# Patient Record
Sex: MALE | Race: BLACK OR AFRICAN AMERICAN | NOT HISPANIC OR LATINO | Employment: UNEMPLOYED | URBAN - METROPOLITAN AREA
[De-identification: names, ages, dates, MRNs, and addresses within clinical notes are randomized per-mention and may not be internally consistent; named-entity substitution may affect disease eponyms.]

---

## 2020-08-17 NOTE — PROGRESS NOTES
Clinic Note  2020      Subjective:       Patient ID:  Mary is a 29 y.o. male being seen for a new visit.      Chief Complaint: Establish Care, Back Pain (pt c/o back pain started out the blue about 4 months ago...pt stated nothing relieves the pain; hurt to walk due to back pain), and Numbness (bilat fingertips)    HPI   Mary is a 29 year old male in clinic to establish care with PCP. States that for the last 4 years he has worked as a . Approximately 4 months ago he began experiencing lower back pain with tingling, numbness radiating to bilateral thighs.  He reported to [facility], xrays and CT performed. He states he was told that he had bulging discs. States that he was also told he has bulging disc in neck, states tingling in bilateral hands.  Denies the use of heat, ice, stretching, PT, OTC topical or oral pain relievers.  He was prescribed Neurontin, needs refill. Needs routine labs. He is hypertensive in clinic today, related to pain.    The following portions of the patient's history were reviewed and updated as appropriate: allergies, current medications, past family history, past medical history, past social history, past surgical history and problem list.      Family History   Family history unknown: Yes     Social History     Socioeconomic History    Marital status: Single     Spouse name: Not on file    Number of children: 2    Years of education: Not on file    Highest education level: Not on file   Occupational History    Occupation: Unemployeed   Social Needs    Financial resource strain: Patient refused    Food insecurity     Worry: Patient refused     Inability: Patient refused    Transportation needs     Medical: Patient refused     Non-medical: Patient refused   Tobacco Use    Smoking status: Former Smoker     Packs/day: 1.00     Types: Cigarettes     Start date:      Quit date: 2020     Years since quittin.1    Smokeless tobacco: Never Used  "  Substance and Sexual Activity    Alcohol use: Not on file     Comment: Ocassional    Drug use: Never    Sexual activity: Not on file   Lifestyle    Physical activity     Days per week: Patient refused     Minutes per session: Patient refused    Stress: Patient refused   Relationships    Social connections     Talks on phone: Patient refused     Gets together: Patient refused     Attends [employer] service: Patient refused     Active member of club or organization: Patient refused     Attends meetings of clubs or organizations: Patient refused     Relationship status: Patient refused   Other Topics Concern    Not on file   Social History Narrative    Not on file     History reviewed. No pertinent surgical history.  Patient Active Problem List   Diagnosis    Bilateral low back pain with sciatica       Review of Systems   Constitutional: Negative for chills, fever, malaise/fatigue and weight loss.   HENT: Negative for congestion, hearing loss, sinus pain, sore throat and tinnitus.    Respiratory: Negative for cough, sputum production, shortness of breath and wheezing.    Cardiovascular: Negative for chest pain, palpitations, claudication and leg swelling.   Gastrointestinal: Negative for constipation, diarrhea, nausea and vomiting.   Genitourinary: Negative for dysuria, frequency, hematuria and urgency.   Musculoskeletal: Positive for back pain and neck pain.           Objective:      BP (!) 148/98 (BP Location: Left arm, Patient Position: Sitting, BP Method: Large (Automatic))   Pulse 82   Temp 97.7 °F (36.5 °C) (Temporal)   Resp (P) 16   Ht (P) 5' 7" (1.702 m)   Wt (P) 95.7 kg (211 lb)   SpO2 98%   BMI (P) 33.05 kg/m²   Estimated body mass index is 33.05 kg/m² (pended) as calculated from the following:    Height as of this encounter: (P) 5' 7" (1.702 m).    Weight as of this encounter: (P) 95.7 kg (211 lb).  Physical Exam   Constitutional: He is oriented to person, place, and time and well-developed, " well-nourished, and in no distress. No distress.   HENT:   Head: Normocephalic and atraumatic.   Right Ear: External ear normal.   Left Ear: External ear normal.   Mouth/Throat: Oropharynx is clear and moist. No oropharyngeal exudate.   Eyes: Conjunctivae are normal. Right eye exhibits no discharge. Left eye exhibits no discharge. No scleral icterus.   Neck: Normal range of motion. No JVD present. No tracheal deviation present.   Cardiovascular: Normal rate, regular rhythm and normal heart sounds.   No murmur heard.  Pulmonary/Chest: Effort normal and breath sounds normal. No respiratory distress. He has no wheezes.   Abdominal: Soft. Bowel sounds are normal. He exhibits no distension. There is no abdominal tenderness. There is no rebound.   Musculoskeletal:      Cervical back: Normal. He exhibits normal range of motion, no tenderness, no deformity and no pain.      Thoracic back: Normal.      Lumbar back: He exhibits tenderness, deformity and pain. He exhibits no swelling, no edema and no spasm.   Neurological: He is alert and oriented to person, place, and time. He displays weakness. He displays facial symmetry. Gait abnormal. Coordination normal. GCS score is 15.   Skin: Skin is dry. He is not diaphoretic. No erythema.   Psychiatric: Mood, memory, affect and judgment normal.   Nursing note and vitals reviewed.        Assessment and Plan:   Encounter to establish care  Routine labs, will call with results  Low back pain with bilateral sciatica, chronic  Increase gabapentin  Diclofenac 75 mg as prescribed  Obtain records from [facility]  Patient will call insurance for Neurologist to send referral  Reassess BP at next appointment  Follow up in 2 weeks and as needed        Problem List Items Addressed This Visit        Orthopedic    Bilateral low back pain with sciatica    Relevant Medications    gabapentin (NEURONTIN) 300 MG capsule    diclofenac (VOLTAREN) 75 MG EC tablet      Other Visit Diagnoses      Encounter to establish care    -  Primary    Laboratory examination ordered as part of a routine general medical examination        Relevant Orders    CBC auto differential    Lipid Panel    Comprehensive metabolic panel    TSH w/reflex to FT4          Risks, benefits, and alternatives discussed with patient, Patient verbalized understanding of discussed plan of care. Asked patient if any further questions, answered no.  Follow up:   Follow up in about 2 weeks (around 9/1/2020), or if symptoms worsen or fail to improve.     Other Orders Placed This Visit:  Orders Placed This Encounter   Procedures    CBC auto differential     Standing Status:   Future     Number of Occurrences:   1     Standing Expiration Date:   10/18/2021    Lipid Panel     Standing Status:   Future     Number of Occurrences:   1     Standing Expiration Date:   10/17/2021    Comprehensive metabolic panel     Standing Status:   Future     Number of Occurrences:   1     Standing Expiration Date:   10/17/2021    TSH w/reflex to FT4     Standing Status:   Future     Number of Occurrences:   1     Standing Expiration Date:   10/17/2021           Colleen Schneider    Problem List Items Addressed This Visit        Orthopedic    Bilateral low back pain with sciatica    Relevant Medications    gabapentin (NEURONTIN) 300 MG capsule    diclofenac (VOLTAREN) 75 MG EC tablet      Other Visit Diagnoses     Encounter to establish care    -  Primary    Laboratory examination ordered as part of a routine general medical examination        Relevant Orders    CBC auto differential    Lipid Panel    Comprehensive metabolic panel    TSH w/reflex to FT4

## 2020-08-18 ENCOUNTER — OFFICE VISIT (OUTPATIENT)
Dept: INTERNAL MEDICINE | Facility: CLINIC | Age: 30
End: 2020-08-18
Payer: MEDICAID

## 2020-08-18 ENCOUNTER — TELEPHONE (OUTPATIENT)
Dept: INTERNAL MEDICINE | Facility: CLINIC | Age: 30
End: 2020-08-18

## 2020-08-18 VITALS
SYSTOLIC BLOOD PRESSURE: 148 MMHG | TEMPERATURE: 98 F | OXYGEN SATURATION: 98 % | HEART RATE: 82 BPM | DIASTOLIC BLOOD PRESSURE: 98 MMHG

## 2020-08-18 DIAGNOSIS — M54.40 BILATERAL LOW BACK PAIN WITH SCIATICA, SCIATICA LATERALITY UNSPECIFIED, UNSPECIFIED CHRONICITY: ICD-10-CM

## 2020-08-18 DIAGNOSIS — Z00.00 LABORATORY EXAMINATION ORDERED AS PART OF A ROUTINE GENERAL MEDICAL EXAMINATION: ICD-10-CM

## 2020-08-18 DIAGNOSIS — Z76.89 ENCOUNTER TO ESTABLISH CARE: Primary | ICD-10-CM

## 2020-08-18 LAB
ABS NRBC COUNT: 0 X 10 3/UL (ref 0–0.01)
ABSOLUTE BASOPHIL: 0.02 X 10 3/UL (ref 0–0.22)
ABSOLUTE EOSINOPHIL: 0.07 X 10 3/UL (ref 0.04–0.54)
ABSOLUTE IMMATURE GRAN: 0.02 X 10 3/UL (ref 0–0.04)
ABSOLUTE LYMPHOCYTE: 2.12 X 10 3/UL (ref 0.86–4.75)
ABSOLUTE MONOCYTE: 0.72 X 10 3/UL (ref 0.22–1.08)
ALBUMIN SERPL-MCNC: 4.6 G/DL (ref 3.5–5.2)
ALBUMIN/GLOB SERPL ELPH: 1.8 {RATIO} (ref 1–2.7)
ALP ISOS SERPL LEV INH-CCNC: 53 U/L (ref 40–130)
ALT (SGPT): 76 U/L (ref 0–41)
ANION GAP SERPL CALC-SCNC: 9 MMOL/L (ref 8–17)
AST SERPL-CCNC: 40 U/L (ref 0–40)
BASOPHILS NFR BLD: 0.4 % (ref 0.2–1.2)
BILIRUBIN, TOTAL: 0.35 MG/DL (ref 0–1.2)
BUN/CREAT SERPL: 9.1 (ref 6–20)
CALCIUM SERPL-MCNC: 9.6 MG/DL (ref 8.6–10.2)
CARBON DIOXIDE, CO2: 28 MMOL/L (ref 22–29)
CHLORIDE: 103 MMOL/L (ref 98–107)
CHOLEST SERPL-MSCNC: 248 MG/DL (ref 100–200)
CREAT SERPL-MCNC: 1.01 MG/DL (ref 0.7–1.2)
EOSINOPHIL NFR BLD: 1.4 % (ref 0.7–7)
GFR ESTIMATION: 86.16
GLOBULIN: 2.6 G/DL (ref 1.5–4.5)
GLUCOSE: 90 MG/DL (ref 74–106)
HCT VFR BLD AUTO: 49.8 % (ref 42–52)
HDLC SERPL-MCNC: 36 MG/DL
HGB BLD-MCNC: 16.1 G/DL (ref 14–18)
IMMATURE GRANULOCYTES: 0.4 % (ref 0–0.5)
LDL/HDL RATIO: 4.8 (ref 1–3)
LDLC SERPL CALC-MCNC: 172.2 MG/DL (ref 0–100)
LYMPHOCYTES NFR BLD: 43 % (ref 19.3–53.1)
MCH RBC QN AUTO: 28.2 PG (ref 27–32)
MCHC RBC AUTO-ENTMCNC: 32.3 G/DL (ref 32–36)
MCV RBC AUTO: 87.2 FL (ref 80–94)
MONOCYTES NFR BLD: 14.6 % (ref 4.7–12.5)
NEUTROPHILS ABSOLUTE COUNT: 1.98 X 10 3/UL (ref 2.15–7.56)
NEUTROPHILS NFR BLD: 40.2 % (ref 34–71.1)
NUCLEATED RED BLOOD CELLS: 0 /100 WBC (ref 0–0.2)
PLATELET # BLD AUTO: 219 X 10 3/UL (ref 135–400)
POTASSIUM: 4.3 MMOL/L (ref 3.5–5.1)
PROT SNV-MCNC: 7.2 G/DL (ref 6.4–8.3)
RBC # BLD AUTO: 5.71 X 10 6/UL (ref 4.7–6.1)
RDW-SD: 38.5 FL (ref 37–54)
SODIUM: 140 MMOL/L (ref 136–145)
TRIGL SERPL-MCNC: 199 MG/DL (ref 0–150)
TSH W/REFLEX TO FT4: 1.58 UIU/ML (ref 0.27–4.2)
UREA NITROGEN (BUN): 9.2 MG/DL (ref 6–20)
WBC # BLD: 4.93 X 10 3/UL (ref 4.3–10.8)

## 2020-08-18 PROCEDURE — 99203 OFFICE O/P NEW LOW 30 MIN: CPT | Mod: S$GLB,,, | Performed by: NURSE PRACTITIONER

## 2020-08-18 PROCEDURE — 99203 PR OFFICE/OUTPT VISIT, NEW, LEVL III, 30-44 MIN: ICD-10-PCS | Mod: S$GLB,,, | Performed by: NURSE PRACTITIONER

## 2020-08-18 RX ORDER — DICLOFENAC SODIUM 75 MG/1
75 TABLET, DELAYED RELEASE ORAL 3 TIMES DAILY PRN
Qty: 30 TABLET | Refills: 0 | Status: SHIPPED | OUTPATIENT
Start: 2020-08-18 | End: 2020-08-19 | Stop reason: SDUPTHER

## 2020-08-18 RX ORDER — GABAPENTIN 100 MG/1
1 CAPSULE ORAL 3 TIMES DAILY
COMMUNITY
Start: 2020-07-13 | End: 2020-08-18

## 2020-08-18 RX ORDER — GABAPENTIN 300 MG/1
300 CAPSULE ORAL 3 TIMES DAILY
Qty: 90 CAPSULE | Refills: 11 | Status: SHIPPED | OUTPATIENT
Start: 2020-08-18 | End: 2020-08-19 | Stop reason: SDUPTHER

## 2020-08-18 NOTE — TELEPHONE ENCOUNTER
----- Message from Noemi Singleton sent at 8/18/2020 12:51 PM CDT -----  Regarding: pt  Pt called in regards to notify the doctor that he found a neurologist. Please call back [phone]

## 2020-08-19 DIAGNOSIS — M54.40 BILATERAL LOW BACK PAIN WITH SCIATICA, SCIATICA LATERALITY UNSPECIFIED, UNSPECIFIED CHRONICITY: ICD-10-CM

## 2020-08-19 RX ORDER — DICLOFENAC SODIUM 75 MG/1
75 TABLET, DELAYED RELEASE ORAL 3 TIMES DAILY PRN
Qty: 30 TABLET | Refills: 0 | Status: SHIPPED | OUTPATIENT
Start: 2020-08-19 | End: 2021-05-20

## 2020-08-19 RX ORDER — GABAPENTIN 300 MG/1
300 CAPSULE ORAL 3 TIMES DAILY
Qty: 90 CAPSULE | Refills: 0 | Status: SHIPPED | OUTPATIENT
Start: 2020-08-19 | End: 2021-05-20

## 2020-08-19 NOTE — TELEPHONE ENCOUNTER
----- Message from Lynette Patel sent at 8/18/2020  3:21 PM CDT -----  [facility] need to verify patient prescription. Call back number [phone]. Tks

## 2020-08-19 NOTE — TELEPHONE ENCOUNTER
Pharmacy said that they didn't need anything clarified, but they did not receive scripts sent on yesterday. It says confirmed by pharmacy but she said they would have gotten it by now so she suggested it be re-sent.

## 2020-09-16 ENCOUNTER — TELEPHONE (OUTPATIENT)
Dept: INTERNAL MEDICINE | Facility: CLINIC | Age: 30
End: 2020-09-16

## 2020-09-16 NOTE — TELEPHONE ENCOUNTER
----- Message from Glenn Mas sent at 9/16/2020 12:11 PM CDT -----  Regarding: Referral  Type:  Patient Requesting Referral    Who Called:Mary  Does the patient already have the specialty appointment scheduled?:no  If yes, what is the date of that appointment?:na  Referral to What Specialty:Neurology  Reason for Referral:Back pain  Does the patient want the referral with a specific physician?:no  [facility] the [facility]  Patient Requesting a Response?:Yes  Would the patient rather a call back or a response via [facility]? Call back  Best Call Back Number:[phone] (home)   Additional Information: na

## 2020-09-17 DIAGNOSIS — M50.30 BULGING OF CERVICAL INTERVERTEBRAL DISC: Primary | ICD-10-CM

## 2020-09-21 ENCOUNTER — TELEPHONE (OUTPATIENT)
Dept: INTERNAL MEDICINE | Facility: CLINIC | Age: 30
End: 2020-09-21

## 2020-09-21 NOTE — TELEPHONE ENCOUNTER
----- Message from Glenn Mas sent at 9/21/2020  1:50 PM CDT -----  Regarding: Referral question  Please call Mary to discuss a referral question he has [phone] (home).

## 2020-09-30 ENCOUNTER — TELEPHONE (OUTPATIENT)
Dept: PRIMARY CARE CLINIC | Facility: CLINIC | Age: 30
End: 2020-09-30

## 2020-09-30 NOTE — TELEPHONE ENCOUNTER
----- Message from Glenn Mas sent at 9/30/2020  1:33 PM CDT -----  Regarding: Referral question  Please call Mary to let him know if you can print the referral he needs for neurology so he can bring it to the doctor he needs to see. Apparently, their clinic never got the referral. His number is [phone].

## 2021-05-14 ENCOUNTER — TELEPHONE (OUTPATIENT)
Dept: FAMILY MEDICINE | Facility: CLINIC | Age: 31
End: 2021-05-14

## 2021-05-20 ENCOUNTER — OFFICE VISIT (OUTPATIENT)
Dept: PRIMARY CARE CLINIC | Facility: CLINIC | Age: 31
End: 2021-05-20
Payer: MEDICAID

## 2021-05-20 VITALS
DIASTOLIC BLOOD PRESSURE: 83 MMHG | WEIGHT: 213 LBS | RESPIRATION RATE: 18 BRPM | SYSTOLIC BLOOD PRESSURE: 156 MMHG | HEART RATE: 100 BPM | BODY MASS INDEX: 33.43 KG/M2 | OXYGEN SATURATION: 99 % | HEIGHT: 67 IN

## 2021-05-20 DIAGNOSIS — I10 BENIGN ESSENTIAL HTN: Primary | ICD-10-CM

## 2021-05-20 DIAGNOSIS — G47.19 EXCESSIVE DAYTIME SLEEPINESS: ICD-10-CM

## 2021-05-20 DIAGNOSIS — R53.83 OTHER FATIGUE: ICD-10-CM

## 2021-05-20 DIAGNOSIS — E66.09 CLASS 1 OBESITY DUE TO EXCESS CALORIES WITHOUT SERIOUS COMORBIDITY WITH BODY MASS INDEX (BMI) OF 33.0 TO 33.9 IN ADULT: ICD-10-CM

## 2021-05-20 PROCEDURE — 99214 PR OFFICE/OUTPT VISIT, EST, LEVL IV, 30-39 MIN: ICD-10-PCS | Mod: S$GLB,,, | Performed by: NURSE PRACTITIONER

## 2021-05-20 PROCEDURE — 99214 OFFICE O/P EST MOD 30 MIN: CPT | Mod: S$GLB,,, | Performed by: NURSE PRACTITIONER

## 2021-05-20 RX ORDER — AMLODIPINE BESYLATE 5 MG/1
5 TABLET ORAL DAILY
Qty: 30 TABLET | Refills: 2 | Status: SHIPPED | OUTPATIENT
Start: 2021-05-20

## 2021-06-03 ENCOUNTER — OFFICE VISIT (OUTPATIENT)
Dept: PRIMARY CARE CLINIC | Facility: CLINIC | Age: 31
End: 2021-06-03
Payer: MEDICAID

## 2021-06-03 VITALS
SYSTOLIC BLOOD PRESSURE: 132 MMHG | DIASTOLIC BLOOD PRESSURE: 81 MMHG | HEIGHT: 67 IN | RESPIRATION RATE: 18 BRPM | WEIGHT: 216 LBS | OXYGEN SATURATION: 100 % | BODY MASS INDEX: 33.9 KG/M2 | HEART RATE: 90 BPM

## 2021-06-03 DIAGNOSIS — I10 BENIGN ESSENTIAL HTN: Primary | ICD-10-CM

## 2021-06-03 DIAGNOSIS — E66.09 CLASS 1 OBESITY DUE TO EXCESS CALORIES WITHOUT SERIOUS COMORBIDITY WITH BODY MASS INDEX (BMI) OF 33.0 TO 33.9 IN ADULT: ICD-10-CM

## 2021-06-03 PROCEDURE — 99213 OFFICE O/P EST LOW 20 MIN: CPT | Mod: S$GLB,,, | Performed by: NURSE PRACTITIONER

## 2021-06-03 PROCEDURE — 99213 PR OFFICE/OUTPT VISIT, EST, LEVL III, 20-29 MIN: ICD-10-PCS | Mod: S$GLB,,, | Performed by: NURSE PRACTITIONER

## 2022-06-24 ENCOUNTER — PATIENT OUTREACH (OUTPATIENT)
Dept: ADMINISTRATIVE | Facility: HOSPITAL | Age: 32
End: 2022-06-24
Payer: MEDICAID

## 2022-06-24 NOTE — PROGRESS NOTES
Working HTN gap report for pts not seen in the past 12 months or longer. Spoke with pt and he requested to be scheduled. Notified Mel . She will contact pt and make sure he gets scheduled to see PCP.

## 2022-06-28 ENCOUNTER — TELEPHONE (OUTPATIENT)
Dept: PRIMARY CARE CLINIC | Facility: CLINIC | Age: 32
End: 2022-06-28
Payer: MEDICAID

## 2022-06-28 NOTE — TELEPHONE ENCOUNTER
----- Message from Mame Irvin NP sent at 6/27/2022 12:48 PM CDT -----  Regarding: RE: Appt  Hi there!  Can you please schedule this patient an appt when one is available. It looks like he head a no show after his first visit. Thanks :).    ----- Message -----  From: Shauna Benites LPN  Sent: 6/27/2022  11:56 AM CDT  To: Mame Irvin NP  Subject: Appt                                             This patient has not been seen in over a year and would like to schedule an appt. Please have the  contact him. He is a [facility] pt. Thank you.

## 2023-02-10 ENCOUNTER — PATIENT OUTREACH (OUTPATIENT)
Dept: ADMINISTRATIVE | Facility: HOSPITAL | Age: 33
End: 2023-02-10
Payer: MEDICAID